# Patient Record
Sex: MALE | Race: WHITE
[De-identification: names, ages, dates, MRNs, and addresses within clinical notes are randomized per-mention and may not be internally consistent; named-entity substitution may affect disease eponyms.]

---

## 2019-01-09 ENCOUNTER — APPOINTMENT (OUTPATIENT)
Dept: PULMONOLOGY | Facility: CLINIC | Age: 69
End: 2019-01-09
Payer: MEDICARE

## 2019-01-09 VITALS
HEART RATE: 64 BPM | DIASTOLIC BLOOD PRESSURE: 74 MMHG | WEIGHT: 191 LBS | TEMPERATURE: 98.6 F | BODY MASS INDEX: 27.35 KG/M2 | HEIGHT: 70 IN | RESPIRATION RATE: 15 BRPM | SYSTOLIC BLOOD PRESSURE: 122 MMHG

## 2019-01-09 DIAGNOSIS — Z78.9 OTHER SPECIFIED HEALTH STATUS: ICD-10-CM

## 2019-01-09 DIAGNOSIS — G47.31 PRIMARY CENTRAL SLEEP APNEA: ICD-10-CM

## 2019-01-09 DIAGNOSIS — G47.33 OBSTRUCTIVE SLEEP APNEA (ADULT) (PEDIATRIC): ICD-10-CM

## 2019-01-09 DIAGNOSIS — I25.10 ATHEROSCLEROTIC HEART DISEASE OF NATIVE CORONARY ARTERY W/OUT ANGINA PECTORIS: ICD-10-CM

## 2019-01-09 DIAGNOSIS — C44.310 BASAL CELL CARCINOMA OF SKIN OF UNSPECIFIED PARTS OF FACE: ICD-10-CM

## 2019-01-09 PROCEDURE — 99203 OFFICE O/P NEW LOW 30 MIN: CPT

## 2019-01-09 RX ORDER — ATORVASTATIN CALCIUM 80 MG/1
80 TABLET, FILM COATED ORAL
Refills: 0 | Status: ACTIVE | COMMUNITY

## 2019-01-09 RX ORDER — VALSARTAN 160 MG/1
160 TABLET, COATED ORAL
Refills: 0 | Status: ACTIVE | COMMUNITY

## 2019-01-09 RX ORDER — TICAGRELOR 60 MG/1
60 TABLET ORAL
Refills: 0 | Status: ACTIVE | COMMUNITY

## 2019-01-09 RX ORDER — METOPROLOL TARTRATE 50 MG/1
50 TABLET, FILM COATED ORAL
Refills: 0 | Status: ACTIVE | COMMUNITY

## 2019-01-09 NOTE — ASSESSMENT
[FreeTextEntry1] : 68 year old RYAN MOORE with comorbid CAD, BCC; presents for continued management of severe obstructive sleep apnea and treatment-emergent central sleep apnea, treated with ASV.\par \par A review of therapeutic and compliance data reveals adequate compliance with usage on 70% of nights averaging 6 hours 27 minutes; effective settings at (EPAP 5-15) (PS 3-15) cmH2O in ASVAuto mode, with therapy based AHI of 0.3 per hour, and no significant mask leak.   \par \par The patient is benefitting from ASV therapy with continued resolution of sleep disordered breathing symptoms and should continue ASV therapy at the current pressure settings.  An order for renewal of supplies will be sent to [facility].  He will follow up in one year or sooner if needed.

## 2019-01-09 NOTE — REASON FOR VISIT
[Initial Eval - Existing Diagnosis] : an initial evaluation of an existing diagnosis [Sleep Apnea] : sleep apnea

## 2019-01-09 NOTE — HISTORY OF PRESENT ILLNESS
[ESS: ___] : ESS score [unfilled] [AHI: ___ per hour] : Apnea-hypopnea index:  [unfilled] per hour [T90%: ___] : T90%: [unfilled]% [Willam desatuation%: ___] : Willam desaturation:  [unfilled]% [Date: ___] : the most recent therapeutic polysomnogram was completed [unfilled] [ASV w/EPAP: ____ cmH2O] : ASV with EPAP: [unfilled] cmH2O [ASV w/Min. Pressure Support: ___ cmH2O] : ASV with minimal pressure support: [unfilled] cmH2O [% Days used: ____] : Days used: [unfilled] % [% Days used > 4 hrs: ____] : Days used > 4 hrs: [unfilled] % [Mean nightly usage: ___ hrs] : Mean nightly usage: [unfilled] hrs [Therapy based AHI: ___ /hr] : Therapy based AHI: [unfilled] / hr [FreeTextEntry1] : This 68 year old male presents for follow-up to 2014 visit for continued management of treatment-emergent central sleep apnea and obstructive sleep apnea on ASV.\par \par COMORBID:  CAD (single stent placement in 2016) and facial BCC.\par \par The patient has been treated with ASV since he was diagnosed with severe ADELSO and treatment emergent CSA in 2009.  With 7-8 hours of ASV use most nights, he states "it keeps me asleep", he no longer snores and his sleep is no longer fragmented.  \par \par He is a retired , having worked rotating shifts for >30-years.  He intentionally lost ~15 lbs weight the past 7-months.  He sleeps 7-8 hours between 12AM-8AM, with a 1/2-3/4 hour nap in the afternoon once or twice weekly, which he finds refreshing.  He consumes 2 beers daily, and no caffeine.  \par \par The tubing is broken.  On visual inspection, he has it sealed with tape.  He needs a renewal of supplies.  [Nocturnal Oxygen] : The patient does not use nocturnal oxygen

## 2019-01-09 NOTE — REVIEW OF SYSTEMS
[Recent Wt Loss (___ Lbs)] : recent [unfilled] ~Ulb weight loss [Negative] : Psychiatric [Chest Pain] : no chest pain [Palpitations] : no palpitations [CHF] : no congestive heart failure [Snoring] : no snoring [Witnessed Apneas] : demonstrated no ~M apnea [Frequent Nocturnal Awakenings] : no nocturnal awakenings from sleep [Daytime Somnolence: ESS=____] : no daytime somnolence [Unintentional Sleep while active] : no unintentional sleep while active [Unintentional Sleep while inactive] : no unintentional sleep while inactive [Awakes Unrefreshed] : restorative sleep [Awakes With Headache] : awakes without a headache [Awakes With Dry Mouth] : awakes without dry mouth [Recent Wt Gain (___ Lbs)] : no recent weight gain [Difficulty Initiating Sleep] : no difficulty falling asleep [Difficulty Maintaining Sleep] : no difficulty maintaining sleep [Lower Extremity Discomfort] : no lower extremity discomfort [Unusual Sleep Behavior] : no unusual sleep behavior [FreeTextEntry9] : MI, CAD with single stent placed in 2016 [FreeTextEntry1] : 12 AM [FreeTextEntry2] : 7-8 AM [FreeTextEntry3] : quickly [FreeTextEntry4] : 0 [FreeTextEntry6] : 7-8 hours [FreeTextEntry7] : 30-45 minute refreshing afternoon nap once/twice weekly

## 2023-04-28 NOTE — PHYSICAL EXAM
Negative [Normal Appearance] : normal appearance [Well Groomed] : well groomed [General Appearance - In No Acute Distress] : no acute distress [Normal Conjunctiva] : the conjunctiva exhibited no abnormalities [IV] : IV [Neck Appearance] : the appearance of the neck was normal [Neck Circumference: ___] : neck circumference is [unfilled] [Heart Rate And Rhythm] : heart rate was normal and rhythm regular [Murmurs] : no murmurs [] : no respiratory distress [Auscultation Breath Sounds / Voice Sounds] : lungs were clear to auscultation bilaterally [Involuntary Movements] : no involuntary movements were seen [No Focal Deficits] : no focal deficits [Affect] : the affect was normal [Mood] : the mood was normal [FreeTextEntry2] : no edema